# Patient Record
(demographics unavailable — no encounter records)

---

## 2024-10-30 NOTE — ADDENDUM
[FreeTextEntry1] : I, Pino Branch, documented this note as a scribe on behalf of Bubba Don MD on 10/30/2024.

## 2024-10-30 NOTE — DISCUSSION/SUMMARY
[de-identified] : We discussed further treatment options. She continues to have some back pain as well as neck issues. She is managing this very well with medications. We reviewed her imaging, and she declined obtaining MRI at this time. She is renovating her house currently and is living with her daughter. She will follow up in 3-6 months' time, at which time we will follow up with neck X-rays, sooner if any change or worsening of her symptoms.

## 2024-10-30 NOTE — HISTORY OF PRESENT ILLNESS
[de-identified] : Ms. Ohara presents to the office s/p L4-S1 fusion on 9/27/18.  No new complaints.  Symptoms are the same.   She has low back pain since she is packing up her house.  Requesting diclofenac gel, gabapentin, and cyclobenzaprine refill.

## 2024-10-30 NOTE — PHYSICAL EXAM
[Antalgic] : antalgic [Ataxic] : not ataxic [de-identified] : Examination of the cervical spine reveals no midline or paraspinal tenderness to palpation. No cervical lymphadenopathy. Decreased range of motion with respect to flexion, extension, rotation, and lateral bending. Negative Spurlings. Negative Lhermitte's. Full range of motion bilateral shoulders without evidence of impingement. No instability of bilateral upper extremities.  Cranial nerves II through XII grossly intact. Intact sensation bilateral upper extremities. 5/5 deltoids biceps triceps wrist extensors wrist flexors finger flexors and hand intrinsics. 1+ biceps triceps and brachioradialis reflexes. Negative Ramirez's. 2+ radial pulse. Negative Tinel's over the cubital and carpal tunnel. No skin lesions on the right and left upper extremities.\par  \par  Examination of the lumbar spine reveals no midline tenderness palpation, step-offs, or skin lesions. healed lumbar incision. Decreased range of motion with respect to flexion, extension, lateral bending, and rotation. No tenderness to palpation of the sciatic notch. No tenderness palpation of the bilateral greater trochanters. No pain with passive internal/external rotation of the hips. No instability of bilateral lower extremities.  Negative NEIL. Negative straight leg raise bilaterally. No bowstring. Negative femoral stretch. 5 out of 5 iliopsoas, hip abductors, hips adductors, quadriceps, hamstrings, gastrocsoleus, tibialis anterior, extensor hallucis longus, peroneals. Grossly intact sensation to light touch bilateral lower extremities. 1+ patellar and Achilles reflexes. Downgoing Babinski. No clonus. Intact proprioception. Palpable pulses. No skin lesion and no edema on the right and left lower extremities.\par  \par  Examination of the cervical spine reveals no midline or paraspinal tenderness to palpation. No cervical lymphadenopathy. Decreased range of motion with respect to flexion, extension, rotation, and lateral bending. Negative Spurlings. Negative Lhermitte's. Full range of motion bilateral shoulders without evidence of impingement. No instability of bilateral upper extremities.  Cranial nerves II through XII grossly intact. Intact sensation bilateral upper extremities. 5/5 deltoids biceps triceps wrist extensors wrist flexors finger flexors and hand intrinsics. 1+ biceps triceps and brachioradialis reflexes. Negative Ramirez's. 2+ radial pulse. Negative Tinel's over the cubital and carpal tunnel. No skin lesions on the right and left upper extremities. [de-identified] : AP lateral lumbar x-rays demonstrates instrumented L4-S1 fusion with improved posterior lateral arthrodesis. There may be some trace haloing of the S1 screws. No gross change from prior x-rays.\par  \par  AP lateral cervical x-rays reveals multilevel spondylosis\par  \par  Cervical spine MRI reveals multilevel foraminal stenosis.  No high-grade central stenosis.  No spinal cord compression or spinal cord signal abnormality.\par  \par  Review of her lumbar spine reveals adequate decompression at the surgical levels.  She has adjacent segment degeneration with some increased right greater than left foraminal stenosis

## 2024-10-30 NOTE — END OF VISIT
[FreeTextEntry3] : All medical record entries made by the Scribe were at my, Bubba Don MD, direction and personally dictated by me on 10/30/2024. I have reviewed the chart and agree that the record accurately reflects my personal performance of the history, physical exam, assessment and plan. I have also personally directed, reviewed, and agreed with the chart. [Time Spent: ___ minutes] : I have spent [unfilled] minutes of time on the encounter which excludes teaching and separately reported services.

## 2025-01-15 NOTE — DISCUSSION/SUMMARY
[FreeTextEntry1] : 65 -year-old woman with a history as listed above who is here for a followup visit  Galina is doing well. She denies any anginal symptoms. Clinically she is euvolemic on exam. Her MACDONALD is stable. She is taking lasix PRN  Her EKG is unchanged from previous. She has a baseline LBBB.  She had a nuclear stress that should an apical infarct but I think that this defect is most likely due to breast attenuation. Even so it is a small territory and will be medically managed unless her symptoms worsen.    Her palpitations are improved on Toprol 100mg q12. Continue aspirin 81 mg daily.  Her EKG is unchanged from previous. She has a longstanding LBBB.     She had extremely elevated cholesterol levels. This is most likely secondary to a familial hypercholesterolemia. She did not tolerate many statins. The Crestor wasn't approved by her insurance. She will continue with repatha. She is tolerating lovastatin 10mg QOD. If her lipids are still uncontrolled will try nexeltol.   Her last CT of chest in 7/2023 showed a stable ascending aortic size (4.9cm). She needs a repeat CT prior to her next visit. .    Smoking cessation counseling was performed for >3 mins.     She will always require antibiotic prophylaxis prior to dental surgery.   She will followup with me in 6  months or sooner if necessary. She will follow up with you for all of her other medical needs. [EKG obtained to assist in diagnosis and management of assessed problem(s)] : EKG obtained to assist in diagnosis and management of assessed problem(s)

## 2025-01-15 NOTE — PHYSICAL EXAM
[General Appearance - Well Developed] : well developed [General Appearance - In No Acute Distress] : no acute distress [Normal Conjunctiva] : the conjunctiva exhibited no abnormalities [No Oral Pallor] : no oral pallor [Respiration, Rhythm And Depth] : normal respiratory rhythm and effort [Auscultation Breath Sounds / Voice Sounds] : lungs were clear to auscultation bilaterally [Bowel Sounds] : normal bowel sounds [Abdomen Tenderness] : non-tender [Abnormal Walk] : normal gait [Cyanosis, Localized] : no localized cyanosis [] : no rash [Oriented To Time, Place, And Person] : oriented to person, place, and time [Not Palpable] : not palpable [No Precordial Heave] : no precordial heave was noted [Normal Rate] : normal [Rhythm Regular] : regular [Normal S1] : normal S1 [Normal S2] : normal S2 [No Gallop] : no gallop heard [II] : a grade 2 [III] : a grade 3 [Carotids] : the murmur was transmitted to the carotid arteries [1+] : left 1+ [No Abnormalities] : the abdominal aorta was not enlarged and no bruit was heard [___ +] : bilateral [unfilled]U+ pretibial pitting edema [FreeTextEntry1] : well healed sternotomy scar [Apical Thrill] : no thrill palpable at the apex [Click] : no click [Pericardial Rub] : no pericardial rub

## 2025-01-15 NOTE — HISTORY OF PRESENT ILLNESS
[FreeTextEntry1] : 64 year-old woman with a history of a bicuspid aortic valve with severe aortic stenosis, ascending aortic aneurysm,  hyperlipidemia, a recent GI bleed, nonobstructive coronary disease now  s/p AVR(B).  She is now here for a followup visit. Since her last visit, she is now living [place] and walking more. 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 She is limited by her back and neuropathic pain.  She notes mild MACDONALD with stairs.  She denies any chest pain. She   denies any near syncope, syncope, stroke like symptoms. She has been complaining diaphoresis, arthralgias.   She is smoking still about 1/4 pack a day.  She is compliant with her medications.

## 2025-01-15 NOTE — CARDIOLOGY SUMMARY
[de-identified] :  LBBB [de-identified] : 6/2020 small moderate defect in apical and distal anterior wall that is fixed. EF 58 8/2023 small-sized, moderate to severe defect(s) in the apex, apical anterior, apical septal and apical inferior walls that are fixed suggestive of an infarct. [de-identified] : 6/2020 AVR(b), mild ao dilation. normal lv function EF 65% 7/2023 A bioprosthetic valve replacement present in the aortic position. Well seated aortic valve prosthesis with normal function. Trace intravalvular regurgitation. normal LV function.  5. The proximal ascending aorta is moderately dilated at 4.4cm. 7/2024 normal LV function. avr(b). Ao 4.8 [de-identified] : 7/2023 CTA aorta stable ascending aorta 4.9cm.  [de-identified] : 11/2013 Elías)

## 2025-02-07 NOTE — PHYSICAL EXAM
[de-identified] : Constitutional:  65 year old female, alert and oriented, cooperative, in no acute distress.  HEENT  NC/AT.  Appearance: symmetric  Neck/Back Straight without deformity or instability.  Good ROM.  Chest/Respiratory  Respiratory effort: no intercostal retractions or use of accessory muscles. Nonlabored Breathing  Mental Status:  Judgment, insight: intact Orientation: oriented to time, place, and person  Neurological: Sensory and Motor are grossly intact throughout  Left Hip Exam: Inspection/Appearance:      Incision well healed, no erythema or drainage  Tenderness:                  NEIL Test: Negative                 FADIR Test: Negative  Range of Motion:                 Extension - 0  	Flexion - 100 	IR - 20  	ER - 30  	Abd - 40  	Add - 30   Stability: Normal without instability  Neurologic Exam     Motor intact including 5/5 Extensor Hallucis Longus, 5/5 Flexor Hallucis Longus, 5/5 Tibialis Anterior and 5/5 Gastrocnemius     Sensation Intact to Light Touch including Saphenous, Sural, Superficial Peroneal, Deep Peroneal, Tibial nerve distributions  Vascular Exam     Foot is warm and well perfused with 2+ Dorsalis Pedis Pulse [de-identified] : XRay:  XRays of the Pelvis (1 View) and Left Hip (2 Views) taken in the office today and reviewed with the patient. XRays demonstrate a Left Hip Intramedullary Nail in good position and alignment.. (my personal interpretation) Components: [place] Gamma Nail

## 2025-02-07 NOTE — PHYSICAL EXAM
[de-identified] : Constitutional:  65 year old female, alert and oriented, cooperative, in no acute distress.  HEENT  NC/AT.  Appearance: symmetric  Neck/Back Straight without deformity or instability.  Good ROM.  Chest/Respiratory  Respiratory effort: no intercostal retractions or use of accessory muscles. Nonlabored Breathing  Mental Status:  Judgment, insight: intact Orientation: oriented to time, place, and person  Neurological: Sensory and Motor are grossly intact throughout  Left Hip Exam: Inspection/Appearance:      Incision well healed, no erythema or drainage  Tenderness:                  NEIL Test: Negative                 FADIR Test: Negative  Range of Motion:                 Extension - 0  	Flexion - 100 	IR - 20  	ER - 30  	Abd - 40  	Add - 30   Stability: Normal without instability  Neurologic Exam     Motor intact including 5/5 Extensor Hallucis Longus, 5/5 Flexor Hallucis Longus, 5/5 Tibialis Anterior and 5/5 Gastrocnemius     Sensation Intact to Light Touch including Saphenous, Sural, Superficial Peroneal, Deep Peroneal, Tibial nerve distributions  Vascular Exam     Foot is warm and well perfused with 2+ Dorsalis Pedis Pulse [de-identified] : XRay:  XRays of the Pelvis (1 View) and Left Hip (2 Views) taken in the office today and reviewed with the patient. XRays demonstrate a Left Hip Intramedullary Nail in good position and alignment.. (my personal interpretation) Components: [place] Gamma Nail

## 2025-02-07 NOTE — DISCUSSION/SUMMARY
[de-identified] : Galina Ohara is a 65-year-old female who presents to the office for follow-up of her left hip IMN.  X-rays showed left hip IMN in good position and alignment.  Examination showed good left hip range of motion. Discussed with the patient the examination and imaging findings. Discussed the management of her left hip IMN at this time, including physical therapy and DVT prophylaxis. Patient was given a referral to physical therapy. Patient will continued to take Eliquis twice daily for a total of 6 weeks for DVT prophylaxis. Dressing was removed. Discussed that patient may shower, but should not soak the wound. Patient will follow up in 4 weeks for reevaluation and management. Patient understanding and in agreement with the plan. All questions answered.     Plan: -Physical Therapy -DVT Prophylaxis: Eliquis twice daily for a total of 6 weeks -Patient may shower, but should not soak the wound -Follow up in 4 weeks for reevaluation and management

## 2025-02-07 NOTE — HISTORY OF PRESENT ILLNESS
[de-identified] : Galina Ohara is a 65-year-old female presents to the office for follow-up of her left hip IMN.  Patient underwent left hip IMN for left intertrochanteric hip fracture.  She is currently doing well overall.  Patient is at home.  She is currently in physical therapy.  She is taking her Eliquis.

## 2025-02-07 NOTE — DISCUSSION/SUMMARY
[de-identified] : Galina Ohara is a 65-year-old female who presents to the office for follow-up of her left hip IMN.  X-rays showed left hip IMN in good position and alignment.  Examination showed good left hip range of motion. Discussed with the patient the examination and imaging findings. Discussed the management of her left hip IMN at this time, including physical therapy and DVT prophylaxis. Patient was given a referral to physical therapy. Patient will continued to take Eliquis twice daily for a total of 6 weeks for DVT prophylaxis. Dressing was removed. Discussed that patient may shower, but should not soak the wound. Patient will follow up in 4 weeks for reevaluation and management. Patient understanding and in agreement with the plan. All questions answered.     Plan: -Physical Therapy -DVT Prophylaxis: Eliquis twice daily for a total of 6 weeks -Patient may shower, but should not soak the wound -Follow up in 4 weeks for reevaluation and management

## 2025-02-07 NOTE — HISTORY OF PRESENT ILLNESS
[de-identified] : Galina Ohara is a 65-year-old female presents to the office for follow-up of her left hip IMN.  Patient underwent left hip IMN for left intertrochanteric hip fracture.  She is currently doing well overall.  Patient is at home.  She is currently in physical therapy.  She is taking her Eliquis.

## 2025-03-07 NOTE — DISCUSSION/SUMMARY
[de-identified] : Galina Ohara is a 65-year-old female who presents to the office for follow-up of her left hip IMN.  X-rays showed left hip IMN in good position and alignment.  Examination showed good left hip range of motion. Discussed with the patient the examination and imaging findings. Discussed the management of her left hip IMN at this time, including physical therapy. Patient will continue physical therapy. Patient has completed DVT prophylaxis. Patient will follow up in 6 weeks for reevaluation and management. Patient understanding and in agreement with the plan. All questions answered.     Plan: -Physical Therapy -DVT Prophylaxis: Completed -Follow up in 6 weeks for reevaluation and management

## 2025-03-07 NOTE — HISTORY OF PRESENT ILLNESS
[de-identified] : 3/7/2025  Galina Ohara presents to the office for follow-up of her left hip IMN.  She is currently doing well overall.  Patient is walking with a cane.  She is currently in physical therapy.  She can have some knee and ankle pain.  2/6/2025 Galina Ohara is a 65-year-old female presents to the office for follow-up of her left hip IMN.  Patient underwent left hip IMN for left intertrochanteric hip fracture.  She is currently doing well overall.  Patient is at home.  She is currently in physical therapy.  She is taking her Eliquis.

## 2025-03-07 NOTE — HISTORY OF PRESENT ILLNESS
[de-identified] : 3/7/2025  Galina Ohara presents to the office for follow-up of her left hip IMN.  She is currently doing well overall.  Patient is walking with a cane.  She is currently in physical therapy.  She can have some knee and ankle pain.  2/6/2025 Galina Ohara is a 65-year-old female presents to the office for follow-up of her left hip IMN.  Patient underwent left hip IMN for left intertrochanteric hip fracture.  She is currently doing well overall.  Patient is at home.  She is currently in physical therapy.  She is taking her Eliquis.

## 2025-03-07 NOTE — PHYSICAL EXAM
[de-identified] : Constitutional:  65 year old female, alert and oriented, cooperative, in no acute distress.  HEENT  NC/AT.  Appearance: symmetric  Neck/Back Straight without deformity or instability.  Good ROM.  Chest/Respiratory  Respiratory effort: no intercostal retractions or use of accessory muscles. Nonlabored Breathing  Mental Status:  Judgment, insight: intact Orientation: oriented to time, place, and person  Neurological: Sensory and Motor are grossly intact throughout  Left Hip Exam: Inspection/Appearance:      Incision well healed, no erythema or drainage  Tenderness:                  NEIL Test: Negative                 FADIR Test: Negative  Range of Motion:                 Extension - 0  	Flexion - 100 	IR - 20  	ER - 30  	Abd - 40  	Add - 30   Stability: Normal without instability  Neurologic Exam     Motor intact including 5/5 Extensor Hallucis Longus, 5/5 Flexor Hallucis Longus, 5/5 Tibialis Anterior and 5/5 Gastrocnemius     Sensation Intact to Light Touch including Saphenous, Sural, Superficial Peroneal, Deep Peroneal, Tibial nerve distributions  Vascular Exam     Foot is warm and well perfused with 2+ Dorsalis Pedis Pulse [de-identified] : XRay:  XRays of the Pelvis (1 View) and Left Hip (2 Views) taken today in the office. XRays demonstrate a Left Hip Intramedullary Nail in good position and alignment.. (my personal interpretation) Components: [place] Gamma Nail

## 2025-03-07 NOTE — PHYSICAL EXAM
[de-identified] : Constitutional:  65 year old female, alert and oriented, cooperative, in no acute distress.  HEENT  NC/AT.  Appearance: symmetric  Neck/Back Straight without deformity or instability.  Good ROM.  Chest/Respiratory  Respiratory effort: no intercostal retractions or use of accessory muscles. Nonlabored Breathing  Mental Status:  Judgment, insight: intact Orientation: oriented to time, place, and person  Neurological: Sensory and Motor are grossly intact throughout  Left Hip Exam: Inspection/Appearance:      Incision well healed, no erythema or drainage  Tenderness:                  NEIL Test: Negative                 FADIR Test: Negative  Range of Motion:                 Extension - 0  	Flexion - 100 	IR - 20  	ER - 30  	Abd - 40  	Add - 30   Stability: Normal without instability  Neurologic Exam     Motor intact including 5/5 Extensor Hallucis Longus, 5/5 Flexor Hallucis Longus, 5/5 Tibialis Anterior and 5/5 Gastrocnemius     Sensation Intact to Light Touch including Saphenous, Sural, Superficial Peroneal, Deep Peroneal, Tibial nerve distributions  Vascular Exam     Foot is warm and well perfused with 2+ Dorsalis Pedis Pulse [de-identified] : XRay:  XRays of the Pelvis (1 View) and Left Hip (2 Views) taken today in the office. XRays demonstrate a Left Hip Intramedullary Nail in good position and alignment.. (my personal interpretation) Components: [place] Gamma Nail

## 2025-03-07 NOTE — DISCUSSION/SUMMARY
[de-identified] : Galina Ohara is a 65-year-old female who presents to the office for follow-up of her left hip IMN.  X-rays showed left hip IMN in good position and alignment.  Examination showed good left hip range of motion. Discussed with the patient the examination and imaging findings. Discussed the management of her left hip IMN at this time, including physical therapy. Patient will continue physical therapy. Patient has completed DVT prophylaxis. Patient will follow up in 6 weeks for reevaluation and management. Patient understanding and in agreement with the plan. All questions answered.     Plan: -Physical Therapy -DVT Prophylaxis: Completed -Follow up in 6 weeks for reevaluation and management

## 2025-04-14 NOTE — ASSESSMENT
[FreeTextEntry1] :  Ms. RÍOS is a 65 year old female current smoker ( 1PPD/ 40 yrs)  with  past medical history of  a bicuspid aortic PD/ valve with severe aortic stenosis, ascending aortic aneurysm, hyperlipidemia, a recent GI bleed, nonobstructive coronary disease , s/p AVR(B) in 2013 with Dr. Collins Ernst , LBBB, HTN, GIB,TIA (1961), Right AL (2017 Dr. Ferrari), L2-S1 laminectomy/fusion (2018 Dr. Don) and Thoracic aortic aneurysm for past 10 years.   Today she presents and reports that she has occasional palpitations and dizziness. She had a mechanical fall in Jan 2025 and was admitted to [facility] and had left hip intramedullary nailing with Dr. Stone on 1/22/2025.  She has pain to LT hip pain down to the leg with numbness. She is getting PT for the LT leg. Denies any chest pain, shortness of breath or pedal edema.   1/22/25 TTE revealed 1. The left ventricular cavity is normal in size. Left ventricular wall thickness is normal. Septal motion is abnormal consistent with previous cardiac surgery. Left ventricular systolic function is mildly decreased with an ejection fraction visually estimated at 60 to 65%. There are regional wall motion abnormalities present. Hypokinesis of the apex. Overall left ventricular systolic function is preserved.  2. Normal right ventricular cavity size and normal right ventricular systolic function.  3. Structurally normal mitral valve with normal leaflet excursion. There is calcification of the mitral valve annulus. There is trace mitral regurgitation.  4. A bioprosthetic valve replacement is present in the aortic position. The prosthetic valve is well seated with normal function. The peak transaortic velocity is 1.81 m/s, peak transaortic gradient is 13.2 mmHg and mean transaortic gradient is 6.3 mmHg with an LVOT/aortic valve VTI ratio of 0.75. There is no intravalvular regurgitation.  5. No prior echocardiogram is available for comparison.  3/19/25 CTA Chest revealed 52 mm dilation of the ascending thoracic aorta.    The patient's medical records and diagnostic images were reviewed at the time of this office visit, and the following recommendation was made. Patient does not meet criteria for surgical intervention at the time and will be entered into the aortic registry surveillance program.    Plan   1. Follow up in 3 months  with TAVR CT  2. Continue medication regimen. 3. Follow up with cardiologist and PCP. 4. BP control- I have recommended the patient to monitor his blood pressure closely. I have also advised the patient to take daily blood pressures at home and adhere to medication regimen. 5. Discussed signs and symptoms that warrant emergency medical attention

## 2025-04-14 NOTE — DATA REVIEWED
[FreeTextEntry1] : 1/22/25 TTE revealed 1. The left ventricular cavity is normal in size. Left ventricular wall thickness is normal. Septal motion is abnormal consistent with previous cardiac surgery. Left ventricular systolic function is mildly decreased with an ejection fraction visually estimated at 60 to 65%. There are regional wall motion abnormalities present. Hypokinesis of the apex. Overall left ventricular systolic function is preserved.  2. Normal right ventricular cavity size and normal right ventricular systolic function.  3. Structurally normal mitral valve with normal leaflet excursion. There is calcification of the mitral valve annulus. There is trace mitral regurgitation.  4. A bioprosthetic valve replacement is present in the aortic position. The prosthetic valve is well seated with normal function. The peak transaortic velocity is 1.81 m/s, peak transaortic gradient is 13.2 mmHg and mean transaortic gradient is 6.3 mmHg with an LVOT/aortic valve VTI ratio of 0.75. There is no intravalvular regurgitation.  5. No prior echocardiogram is available for comparison.  3/19/25 CTA Chest revealed 52 mm dilation of the ascending thoracic aorta.

## 2025-04-14 NOTE — HISTORY OF PRESENT ILLNESS
[FreeTextEntry1] :  Ms. RÍOS is a 65 year old female current smoker ( 1PPD/ 40 yrs)  with  past medical history of  a bicuspid aortic PD/ valve with severe aortic stenosis, ascending aortic aneurysm, hyperlipidemia, a recent GI bleed, nonobstructive coronary disease , s/p AVR(B) in 2013 with Dr. Collins Ernst , LBBB, HTN, GIB,TIA (1961), Right AL (2017 Dr. Ferrari), L2-S1 laminectomy/fusion (2018 Dr. Don) and Thoracic aortic aneurysm for past 10 years.   Today she presents and reports that she has occasional palpitations and dizziness. She had a mechanical fall in Jan 2025 and was admitted to [facility] and had left hip intramedullary nailing with Dr. Stone on 1/22/2025.  She has pain to LT hip pain down to the leg with numbness. She is getting PT for the LT leg. Denies any chest pain, shortness of breath or pedal edema.    NYHA class I

## 2025-04-14 NOTE — HISTORY OF PRESENT ILLNESS
[FreeTextEntry1] :  Ms. RÍOS is a 65 year old female current smoker ( 1PPD/ 40 yrs)  with  past medical history of  a bicuspid aortic PD/ valve with severe aortic stenosis, ascending aortic aneurysm, hyperlipidemia, a recent GI bleed, nonobstructive coronary disease , s/p AVR(B) in 2013 with Dr. Collins Ernst , LBBB, HTN, GIB,TIA (1961), Right AL (2017 Dr. Ferrari), L2-S1 laminectomy/fusion (2018 Dr. Don) and Thoracic aortic aneurysm for past 10 years.   Today she presents and reports that she has occasional palpitations and dizziness. She had a mechanical fall in Jan 2025 and was admitted to [facility] and had left hip intramedullary nailing with Dr. Stone on 1/22/2025.  She has pain to LT hip pain down to the leg with numbness. She is getting PT for the LT leg. Denies any chest pain, shortness of breath or pedal edema.    NYHA class I

## 2025-04-14 NOTE — CONSULT LETTER
[Dear  ___] : Dear  [unfilled], [Courtesy Letter:] : I had the pleasure of seeing your patient, [unfilled], in my office today. [Please see my note below.] : Please see my note below. [Consult Closing:] : Thank you very much for allowing me to participate in the care of this patient.  If you have any questions, please do not hesitate to contact me. [Sincerely,] : Sincerely, [FreeTextEntry2] : Dr. Isidro Ortiz [FreeTextEntry3] : Collins Ernst MD Attending Surgeon  [facility]   Cardiovascular & Thoracic Surgery [facility]

## 2025-04-14 NOTE — ASSESSMENT
[FreeTextEntry1] :  Ms. RÍOS is a 65 year old female current smoker ( 1PPD/ 40 yrs)  with  past medical history of  a bicuspid aortic PD/ valve with severe aortic stenosis, ascending aortic aneurysm, hyperlipidemia, a recent GI bleed, nonobstructive coronary disease , s/p AVR(B) in 2013 with Dr. Collins Ernst , LBBB, HTN, GIB,TIA (1961), Right AL (2017 Dr. Ferrari), L2-S1 laminectomy/fusion (2018 Dr. Don) and Thoracic aortic aneurysm for past 10 years.   Today she presents and reports that she has occasional palpitations and dizziness. She had a mechanical fall in Jan 2025 and was admitted to [facility] and had left hip intramedullary nailing with Dr. Stone on 1/22/2025.  She has pain to LT hip pain down to the leg with numbness. She is getting PT for the LT leg. Denies any chest pain, shortness of breath or pedal edema.   1/22/25 TTE revealed 1. The left ventricular cavity is normal in size. Left ventricular wall thickness is normal. Septal motion is abnormal consistent with previous cardiac surgery. Left ventricular systolic function is mildly decreased with an ejection fraction visually estimated at 60 to 65%. There are regional wall motion abnormalities present. Hypokinesis of the apex. Overall left ventricular systolic function is preserved.  2. Normal right ventricular cavity size and normal right ventricular systolic function.  3. Structurally normal mitral valve with normal leaflet excursion. There is calcification of the mitral valve annulus. There is trace mitral regurgitation.  4. A bioprosthetic valve replacement is present in the aortic position. The prosthetic valve is well seated with normal function. The peak transaortic velocity is 1.81 m/s, peak transaortic gradient is 13.2 mmHg and mean transaortic gradient is 6.3 mmHg with an LVOT/aortic valve VTI ratio of 0.75. There is no intravalvular regurgitation.  5. No prior echocardiogram is available for comparison.  3/19/25 CTA Chest revealed 52 mm dilation of the ascending thoracic aorta.    The patient's medical records and diagnostic images were reviewed at the time of this office visit, and the following recommendation was made. Patient does not meet criteria for surgical intervention at the time and will be entered into the aortic registry surveillance program.    Plan   1. Follow up in 3 months  with TAVR CT  2. Continue medication regimen. 3. Follow up with cardiologist and PCP. 4. BP control- I have recommended the patient to monitor his blood pressure closely. I have also advised the patient to take daily blood pressures at home and adhere to medication regimen. 5. Discussed signs and symptoms that warrant emergency medical attention

## 2025-04-14 NOTE — CONSULT LETTER
[Dear  ___] : Dear  [unfilled], [Courtesy Letter:] : I had the pleasure of seeing your patient, [unfilled], in my office today. [Please see my note below.] : Please see my note below. [Consult Closing:] : Thank you very much for allowing me to participate in the care of this patient.  If you have any questions, please do not hesitate to contact me. [Sincerely,] : Sincerely, [FreeTextEntry2] : Dr. Isidro Ortiz [FreeTextEntry3] : Collins Ernst MD Attending Surgeon  [facility]   Cardiovascular & Thoracic Surgery [facility]

## 2025-04-14 NOTE — PHYSICAL EXAM
[General Appearance - Alert] : alert [General Appearance - In No Acute Distress] : in no acute distress [General Appearance - Well Nourished] : well nourished [General Appearance - Well Developed] : well developed [Sclera] : the sclera and conjunctiva were normal [PERRL With Normal Accommodation] : pupils were equal in size, round, and reactive to light [Outer Ear] : the ears and nose were normal in appearance [Hearing Threshold Finger Rub Not [place]] : hearing was normal [Both Tympanic Membranes Were Examined] : both tympanic membranes were normal [Neck Appearance] : the appearance of the neck was normal [] : no respiratory distress [Apical Impulse] : the apical impulse was normal [Heart Rate And Rhythm] : heart rate was normal and rhythm regular [Heart Sounds] : normal S1 and S2 [Murmurs] : no murmurs [Examination Of The Chest] : the chest was normal in appearance [2+] : left 2+ [Breast Appearance] : normal in appearance [Bowel Sounds] : normal bowel sounds [Abdomen Soft] : soft [FreeTextEntry1] : Deferred  [No CVA Tenderness] : no ~M costovertebral angle tenderness [Abnormal Walk] : normal gait [Involuntary Movements] : no involuntary movements were seen [Skin Color & Pigmentation] : normal skin color and pigmentation [Skin Turgor] : normal skin turgor [No Focal Deficits] : no focal deficits [Oriented To Time, Place, And Person] : oriented to person, place, and time [Impaired Insight] : insight and judgment were intact [Affect] : the affect was normal [Mood] : the mood was normal [Memory Recent] : recent memory was not impaired [Memory Remote] : remote memory was not impaired

## 2025-04-14 NOTE — CONSULT LETTER
[Dear  ___] : Dear  [unfilled], [Courtesy Letter:] : I had the pleasure of seeing your patient, [unfilled], in my office today. [Please see my note below.] : Please see my note below. [Consult Closing:] : Thank you very much for allowing me to participate in the care of this patient.  If you have any questions, please do not hesitate to contact me. [Sincerely,] : Sincerely, [FreeTextEntry2] : Dr. Isidro Ortiz [FreeTextEntry3] : Collins Ernst MD Attending Surgeon  [facility]   Cardiovascular & Thoracic Surgery [facility]

## 2025-04-14 NOTE — HISTORY OF PRESENT ILLNESS
[FreeTextEntry1] :  Ms. RÍOS is a 65 year old female current smoker ( 1PPD/ 40 yrs)  with  past medical history of  a bicuspid aortic PD/ valve with severe aortic stenosis, ascending aortic aneurysm, hyperlipidemia, a recent GI bleed, nonobstructive coronary disease , s/p AVR(B) in 2013 with Dr. Collins Ernst , LBBB, HTN, GIB,TIA (1961), Right AL (2017 Dr. Ferrari), L2-S1 laminectomy/fusion (2018 Dr. Don) and Thoracic aortic aneurysm for past 10 years.   Today she presents and reports that she has occasional palpitations and dizziness. She had a mechanical fall in Jan 2025 and was admitted to [facility] and had left hip intramedullary nailing with Dr. Stone on 1/22/2025.  She has pain to LT hip pain down to the leg with numbness. She is getting PT for the LT leg. Denies any chest pain, shortness of breath or pedal edema.    NYHA class I

## 2025-04-14 NOTE — END OF VISIT
[FreeTextEntry3] :  I, JAN Martinez , personally performed the evaluation and management (E/M) services for this new patient. That E/M includes conducting the initial examination, assessing all conditions, and establishing the plan of care. Today, DELAI HACKETT  was here to observe my evaluation and management services for this patient.

## 2025-04-14 NOTE — PHYSICAL EXAM
[General Appearance - Alert] : alert [General Appearance - In No Acute Distress] : in no acute distress [General Appearance - Well Nourished] : well nourished [General Appearance - Well Developed] : well developed [Sclera] : the sclera and conjunctiva were normal [PERRL With Normal Accommodation] : pupils were equal in size, round, and reactive to light [Outer Ear] : the ears and nose were normal in appearance [Hearing Threshold Finger Rub Not [place]] : hearing was normal [Both Tympanic Membranes Were Examined] : both tympanic membranes were normal [Neck Appearance] : the appearance of the neck was normal [] : no respiratory distress [Apical Impulse] : the apical impulse was normal [Heart Rate And Rhythm] : heart rate was normal and rhythm regular [Heart Sounds] : normal S1 and S2 [Murmurs] : no murmurs [Examination Of The Chest] : the chest was normal in appearance [2+] : left 2+ [Breast Appearance] : normal in appearance [Bowel Sounds] : normal bowel sounds [Abdomen Soft] : soft [FreeTextEntry1] : Deferred  [No CVA Tenderness] : no ~M costovertebral angle tenderness [Abnormal Walk] : normal gait [Involuntary Movements] : no involuntary movements were seen [Skin Color & Pigmentation] : normal skin color and pigmentation [Skin Turgor] : normal skin turgor [No Focal Deficits] : no focal deficits [Oriented To Time, Place, And Person] : oriented to person, place, and time [Impaired Insight] : insight and judgment were intact [Affect] : the affect was normal [Mood] : the mood was normal [Memory Recent] : recent memory was not impaired [Memory Remote] : remote memory was not impaired

## 2025-04-14 NOTE — PHYSICAL EXAM
[General Appearance - Alert] : alert [General Appearance - In No Acute Distress] : in no acute distress [General Appearance - Well Nourished] : well nourished [General Appearance - Well Developed] : well developed [Sclera] : the sclera and conjunctiva were normal [PERRL With Normal Accommodation] : pupils were equal in size, round, and reactive to light [Outer Ear] : the ears and nose were normal in appearance [Hearing Threshold Finger Rub Not [place]] : hearing was normal [Both Tympanic Membranes Were Examined] : both tympanic membranes were normal [Neck Appearance] : the appearance of the neck was normal [] : no respiratory distress [Apical Impulse] : the apical impulse was normal [Heart Rate And Rhythm] : heart rate was normal and rhythm regular [Heart Sounds] : normal S1 and S2 [Murmurs] : no murmurs [Examination Of The Chest] : the chest was normal in appearance [2+] : left 2+ [Breast Appearance] : normal in appearance [Bowel Sounds] : normal bowel sounds [Abdomen Soft] : soft [FreeTextEntry1] : Deferred  [No CVA Tenderness] : no ~M costovertebral angle tenderness [Abnormal Walk] : normal gait [Involuntary Movements] : no involuntary movements were seen [Skin Color & Pigmentation] : normal skin color and pigmentation [Skin Turgor] : normal skin turgor [No Focal Deficits] : no focal deficits [Oriented To Time, Place, And Person] : oriented to person, place, and time [Impaired Insight] : insight and judgment were intact [Affect] : the affect was normal [Mood] : the mood was normal [Memory Recent] : recent memory was not impaired [Memory Remote] : remote memory was not impaired

## 2025-04-14 NOTE — END OF VISIT
[FreeTextEntry3] :  I, JAN Martinez , personally performed the evaluation and management (E/M) services for this new patient. That E/M includes conducting the initial examination, assessing all conditions, and establishing the plan of care. Today, DELIA HACKETT  was here to observe my evaluation and management services for this patient.

## 2025-04-14 NOTE — ASSESSMENT
[FreeTextEntry1] :  Ms. RÍOS is a 65 year old female current smoker ( 1PPD/ 40 yrs)  with  past medical history of  a bicuspid aortic PD/ valve with severe aortic stenosis, ascending aortic aneurysm, hyperlipidemia, a recent GI bleed, nonobstructive coronary disease , s/p AVR(B) in 2013 with Dr. Collins Ernst , LBBB, HTN, GIB,TIA (1961), Right AL (2017 Dr. Ferrari), L2-S1 laminectomy/fusion (2018 Dr. Don) and Thoracic aortic aneurysm for past 10 years.   Today she presents and reports that she has occasional palpitations and dizziness. She had a mechanical fall in Jan 2025 and was admitted to [facility] and had left hip intramedullary nailing with Dr. Stone on 1/22/2025.  She has pain to LT hip pain down to the leg with numbness. She is getting PT for the LT leg. Denies any chest pain, shortness of breath or pedal edema.   1/22/25 TTE revealed 1. The left ventricular cavity is normal in size. Left ventricular wall thickness is normal. Septal motion is abnormal consistent with previous cardiac surgery. Left ventricular systolic function is mildly decreased with an ejection fraction visually estimated at 60 to 65%. There are regional wall motion abnormalities present. Hypokinesis of the apex. Overall left ventricular systolic function is preserved.  2. Normal right ventricular cavity size and normal right ventricular systolic function.  3. Structurally normal mitral valve with normal leaflet excursion. There is calcification of the mitral valve annulus. There is trace mitral regurgitation.  4. A bioprosthetic valve replacement is present in the aortic position. The prosthetic valve is well seated with normal function. The peak transaortic velocity is 1.81 m/s, peak transaortic gradient is 13.2 mmHg and mean transaortic gradient is 6.3 mmHg with an LVOT/aortic valve VTI ratio of 0.75. There is no intravalvular regurgitation.  5. No prior echocardiogram is available for comparison.  3/19/25 CTA Chest revealed 52 mm dilation of the ascending thoracic aorta.    The patient's medical records and diagnostic images were reviewed at the time of this office visit, and the following recommendation was made. Patient does not meet criteria for surgical intervention at the time and will be entered into the aortic registry surveillance program.    Plan   1. Follow up in 3 months  with TAVR CT  2. Continue medication regimen. 3. Follow up with cardiologist and PCP. 4. BP control- I have recommended the patient to monitor his blood pressure closely. I have also advised the patient to take daily blood pressures at home and adhere to medication regimen. 5. Discussed signs and symptoms that warrant emergency medical attention

## 2025-04-30 NOTE — END OF VISIT
[FreeTextEntry3] : All medical record entries made by the Scribe were at my, Bubba Don MD, direction and personally dictated by me on 04/30/2025. I have reviewed the chart and agree that the record accurately reflects my personal performance of the history, physical exam, assessment and plan. I have also personally directed, reviewed, and agreed with the chart. [Time Spent: ___ minutes] : I have spent [unfilled] minutes of time on the encounter which excludes teaching and separately reported services.

## 2025-04-30 NOTE — ADDENDUM
[FreeTextEntry1] : I, Pino Branch, documented this note as a scribe on behalf of Bubba Don MD on 04/30/2025.

## 2025-04-30 NOTE — DISCUSSION/SUMMARY
[de-identified] : We discussed further treatment options. She does have some slightly increased back pain. She attributes this to a fall in January which required surgical fixation of her left hip. At this time, she will continue with physical therapy. Follow up in 3 months.

## 2025-04-30 NOTE — PHYSICAL EXAM
[Antalgic] : antalgic [Ataxic] : not ataxic [Cane] : ambulates with cane [de-identified] : Decreased range of motion of the lumbar spine.  She does have some limitations in hip flexion and knee extension due to recent hip surgery. [de-identified] : AP lateral lumbar x-rays demonstrates instrumented L4-S1 fusion with improved posterior lateral arthrodesis. There may be some trace haloing of the S1 screws. No gross change from prior x-rays.  AP lateral cervical x-rays reveals multilevel spondylosis  Cervical spine MRI reveals multilevel foraminal stenosis.  No high-grade central stenosis.  No spinal cord compression or spinal cord signal abnormality.  Review of her lumbar spine reveals adequate decompression at the surgical levels.  She has adjacent segment degeneration with some increased right greater than left foraminal stenosis

## 2025-04-30 NOTE — HISTORY OF PRESENT ILLNESS
[de-identified] : Ms. Ohara presents to the office s/p L4-S1 fusion on 9/27/18.  She fell in January and had left hip surgery.  She has been having low back pain.  Requesting diclofenac gel, gabapentin, and cyclobenzaprine refill.

## 2025-07-09 NOTE — HISTORY OF PRESENT ILLNESS
[FreeTextEntry1] : 65 year-old woman with a history of a bicuspid aortic valve with severe aortic stenosis, ascending aortic aneurysm,  hyperlipidemia, a recent GI bleed, nonobstructive coronary disease now  s/p AVR(B).  She is now here for a followup visit. 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  She had a mechanical fall in Feb and  needed surgery for a femur fracture. She is doing PT 2 times a week. She denies any chest pain. She   denies any near syncope, syncope, stroke like symptoms. She has been complaining diaphoresis, arthralgias.   She is smoking still about 1/4 pack a day.  She was noncompliant with her lovastatin and repatha. Now restarted.

## 2025-07-09 NOTE — CARDIOLOGY SUMMARY
[de-identified] :  LBBB [de-identified] : 6/2020 small moderate defect in apical and distal anterior wall that is fixed. EF 58 8/2023 small-sized, moderate to severe defect(s) in the apex, apical anterior, apical septal and apical inferior walls that are fixed suggestive of an infarct. 1/2025 The left ventricular cavity is normal in size. Left ventricular wall thickness is normal. Septal motion is abnormal consistent with previous cardiac surgery. Left ventricular systolic function is mildly decreased with an ejection fraction visually estimated at 60 to 65%. There are regional wall motion abnormalities present. Hypokinesis of the apex. Overall left ventricular systolic function is preserved. [de-identified] : 6/2020 AVR(b), mild ao dilation. normal lv function EF 65% 7/2023 A bioprosthetic valve replacement present in the aortic position. Well seated aortic valve prosthesis with normal function. Trace intravalvular regurgitation. normal LV function.  5. The proximal ascending aorta is moderately dilated at 4.4cm. 7/2024 normal LV function. avr(b). Ao 4.8 [de-identified] : 7/2023 CTA aorta stable ascending aorta 4.9cm.  2025 TAVR CT 4.9 ascending aorta  [de-identified] : 11/2013 Elías)

## 2025-07-09 NOTE — DISCUSSION/SUMMARY
[FreeTextEntry1] : 65 -year-old woman with a history as listed above who is here for a followup visit  Galina is doing well. She denies any anginal symptoms. Clinically she is euvolemic on exam. Her MACDONALD is stable. She is taking lasix PRN  Her EKG is unchanged from previous. She has a baseline LBBB.  She had a nuclear stress that should an apical infarct but I think that this defect is most likely due to breast attenuation. Even so it is a small territory and will be medically managed unless her symptoms worsen.    Her palpitations are improved on Toprol 100mg q12. Continue aspirin 81 mg daily.  Her EKG is unchanged from previous. She has a longstanding LBBB.     She had extremely elevated cholesterol levels. This is most likely secondary to a familial hypercholesterolemia. She did not tolerate many statins. The Crestor wasn't approved by her insurance. She has now resumed her meds. She will continue with repatha. She is tolerating lovastatin 10mg QOD. Will repeat her lipids next month. If her lipids are still uncontrolled will try nexeltol.   Her last CT of chest in 7/2025 showed a stable ascending aortic size (4.9cm). She will fu with CT sx.    Smoking cessation counseling was performed for >3 mins.     She will always require antibiotic prophylaxis prior to dental surgery.   She will followup with me in 6  months or sooner if necessary. She will follow up with you for all of her other medical needs. [EKG obtained to assist in diagnosis and management of assessed problem(s)] : EKG obtained to assist in diagnosis and management of assessed problem(s)

## 2025-07-18 NOTE — HISTORY OF PRESENT ILLNESS
[de-identified] : 7/18/2025  Galina Ohara presents to the office for follow-up of her left hip IM nail.  Patient continues to walk with a cane.  Her pain has progressed.  She has lateral hip pain and some knee pain.  She can have posterior hip pain.  No falls.  Patient does continue to smoke cigarettes.  No fevers or chills.  4/4/2025  Galina Ohara presents to the office for follow-up of her left hip IMN.  Patient is currently walking well with a cane.  She is currently in physical therapy.  She can have some continued knee and ankle pain.  She can have some posterior hip pain.  Patient has a history of a lumbar fusion.  3/7/2025  Galina Ohara presents to the office for follow-up of her left hip IMN.  She is currently doing well overall.  Patient is walking with a cane.  She is currently in physical therapy.  She can have some knee and ankle pain.  2/6/2025 Galina Ohara is a 65-year-old female presents to the office for follow-up of her left hip IMN.  Patient underwent left hip IMN for left intertrochanteric hip fracture.  She is currently doing well overall.  Patient is at home.  She is currently in physical therapy.  She is taking her Eliquis.

## 2025-07-18 NOTE — PHYSICAL EXAM
[de-identified] : Constitutional:  65 year old female, alert and oriented, cooperative, in no acute distress.  HEENT  NC/AT.  Appearance: symmetric  Neck/Back Straight without deformity or instability.  Good ROM.  Chest/Respiratory  Respiratory effort: no intercostal retractions or use of accessory muscles. Nonlabored Breathing  Mental Status:  Judgment, insight: intact Orientation: oriented to time, place, and person  Neurological: Sensory and Motor are grossly intact throughout  Left Hip Exam: Inspection/Appearance:      Incision well healed, no erythema or drainage  Tenderness:                  NEIL Test: Negative                 FADIR Test: Negative  Range of Motion:                 Extension - 0  	Flexion - 100 	IR - 20  	ER - 30  	Abd - 40  	Add - 30   Stability: Normal without instability  Neurologic Exam     Motor intact including 5/5 Extensor Hallucis Longus, 5/5 Flexor Hallucis Longus, 5/5 Tibialis Anterior and 5/5 Gastrocnemius     Sensation Intact to Light Touch including Saphenous, Sural, Superficial Peroneal, Deep Peroneal, Tibial nerve distributions  Vascular Exam     Foot is warm and well perfused with 2+ Dorsalis Pedis Pulse [de-identified] : XRay:  XRays of the Pelvis (1 View) and Left Hip (2 Views) taken today in the office. XRays demonstrate a Left Hip Intramedullary Nail. There has been some collapse of the fracture side and varus alignment. There is persistence of the fracture line. (my personal interpretation) Components: Mary Gamma Nail  XRay:  XRays of the Left Ankle (3 Views) taken on 4/4/2025. XRays demonstrate no obvious fracture or dislocation. There is no significant evidence of osteoarthritis or osteophyte formation. (my personal interpretation).  XRay:  XRays of the Left Knee (4 Views) taken on 4/4/2025. XRays demonstrate no obvious fracture or dislocation. There is no significant evidence of osteoarthritis or osteophyte formation. (my personal interpretation).

## 2025-07-18 NOTE — DISCUSSION/SUMMARY
[de-identified] : Galina Ohara is a 65-year-old female who presents to the office for follow-up of her left hip IMN.  X-rays showed left hip fracture collapse and varus progression. Examination showed good left hip range of motion. Discussed with the patient the examination and imaging findings.  Discussed with the patient that there is a possible nonunion of her fracture.  Discussed the operative and nonoperative management of her left hip at this time, including conversion total hip arthroplasty.  Discussed conversion total hip arthroplasty at length, including physical therapy, implants, recovery, surgical approach, and risks.  Discussed smoking cessation prior to any surgery.  Patient does not want surgery at this time.  She would like to consider surgery in the next few months.  Discussed risks of nonoperative management, including further fracture collapse and falls.  Discussed continued monitoring via x-rays.  Discussed the workup of her left hip at this time, including imaging and laboratory workup.  ESR and CRP were ordered.  CT scan of the left hip was ordered.  Patient will follow-up in 6 weeks for reevaluation management.  Patient understanding and in agreement with the plan.  All questions answered.  Plan: - Follow-up ESR and CRP - CT scan of the left hip - Follow-up in 6 weeks for reevaluation and management

## 2025-07-28 NOTE — ASSESSMENT
[FreeTextEntry1] : CT TAVR imaging reviewed and interpreted. Ascending aortic aneurysm is stable. Patient is stable and doing well.  Incidental finding of nonspecific soft tissue prominence on CT TAVR. Discussed finding with patient. No known history of pancreatic abnormality. Patient saw GI over 12 years ago, has not had any recent follow up. Will obtain MR abdomen and patient will follow up with her PCP/GI.  Plan:  - MRA chest in 1 year with aortic registry - Follow up in 1 year. - Continue medication regimen. - Follow up with cardiologist, Dr. Isidro Ortiz. - Blood pressure management reviewed and discussed as it pertains to his/her aortic pathology - MRI pancreas. Follow up with PCP, Dr. Stephen.

## 2025-07-28 NOTE — PHYSICAL EXAM
[Neck Appearance] : the appearance of the neck was normal [] : no respiratory distress [Apical Impulse] : the apical impulse was normal [Heart Rate And Rhythm] : heart rate was normal and rhythm regular [Heart Sounds] : normal S1 and S2 [Abdomen Soft] : soft [Oriented To Time, Place, And Person] : oriented to person, place, and time [Affect] : the affect was normal [Mood] : the mood was normal [FreeTextEntry1] : Ambulates with a cane

## 2025-07-28 NOTE — CONSULT LETTER
[Dear  ___] : Dear  [unfilled], [Courtesy Letter:] : I had the pleasure of seeing your patient, [unfilled], in my office today. [Please see my note below.] : Please see my note below. [Consult Closing:] : Thank you very much for allowing me to participate in the care of this patient.  If you have any questions, please do not hesitate to contact me. [Sincerely,] : Sincerely, [FreeTextEntry2] : Dr. Isidro Ortiz [FreeTextEntry3] : Collins Ernst MD Attending Surgeon  [facility]   Cardiovascular & Thoracic Surgery [facility]

## 2025-07-28 NOTE — REVIEW OF SYSTEMS
[Fever] : no fever [Chills] : no chills [Chest Pain] : no chest pain [Palpitations] : no palpitations [Shortness Of Breath] : no shortness of breath [FreeTextEntry4] : Throat congestion, sometimes wheezing if she forces it

## 2025-07-28 NOTE — END OF VISIT
[FreeTextEntry3] : I, JAN Martinez, personally performed the evaluation and management (E/M) services for this established patient who presents today with an existing condition(s). That E/M includes conducting the examination, assessing all new/exacerbated conditions, and establishing a new plan of care. Today, CHARLI Nuñez, was here to observe my evaluation and management services for this condition to be followed going forward.

## 2025-07-28 NOTE — CONSULT LETTER
[Dear  ___] : Dear  [unfilled], [Courtesy Letter:] : I had the pleasure of seeing your patient, [unfilled], in my office today. [Please see my note below.] : Please see my note below. [Consult Closing:] : Thank you very much for allowing me to participate in the care of this patient.  If you have any questions, please do not hesitate to contact me. [Sincerely,] : Sincerely, [FreeTextEntry2] : Dr. Isidro Ortiz [FreeTextEntry3] : Collins Ernst MD Attending Surgeon  [facility]   Cardiovascular & Thoracic Surgery [facility]

## 2025-07-28 NOTE — DATA REVIEWED
[FreeTextEntry1] :  7/7/25 TAVR CT revealed TAVR measurements, as above. Prior aortic valve replacement. 4.9 cm mid ascending aortic aneurysm. Ecstatic 2.4 cm infrarenal aorta. No dissection. Stable 6 mm left lower lobe nodule since March 2025, on a background of mild emphysema; this can be followed with a chest CT in 12 months to determine continued stability. Nonspecific prominent soft tissue in the region of pancreatic head (11:337), can be further evaluated with MRI.  1/22/25 TTE revealed 1. The left ventricular cavity is normal in size. Left ventricular wall thickness is normal. Septal motion is abnormal consistent with previous cardiac surgery. Left ventricular systolic function is mildly decreased with an ejection fraction visually estimated at 60 to 65%. There are regional wall motion abnormalities present. Hypokinesis of the apex. Overall left ventricular systolic function is preserved.  2. Normal right ventricular cavity size and normal right ventricular systolic function.  3. Structurally normal mitral valve with normal leaflet excursion. There is calcification of the mitral valve annulus. There is trace mitral regurgitation.  4. A bioprosthetic valve replacement is present in the aortic position. The prosthetic valve is well seated with normal function. The peak transaortic velocity is 1.81 m/s, peak transaortic gradient is 13.2 mmHg and mean transaortic gradient is 6.3 mmHg with an LVOT/aortic valve VTI ratio of 0.75. There is no intravalvular regurgitation.  5. No prior echocardiogram is available for comparison.  3/19/25 CTA Chest revealed 52 mm dilation of the ascending thoracic aorta.

## 2025-07-28 NOTE — HISTORY OF PRESENT ILLNESS
[FreeTextEntry1] : Ms. RÍOS is a 65 year old female current smoker ( 1PPD/ 40 yrs)  with  past medical history of  a bicuspid aortic PD/ valve with severe aortic stenosis, ascending aortic aneurysm, hyperlipidemia, a recent GI bleed, nonobstructive coronary disease , s/p AVR(B) in 2013 with Dr. Collins Ernst , LBBB, HTN, GIB,TIA (1961), Right AL (2017 Dr. Ferrari), L2-S1 laminectomy/fusion (2018 Dr. Don) and Thoracic aortic aneurysm for past 10 years. She is here for follow up visit with recent imaging.   Today she presents and reports that she is doing well in regard to cardiac issues. patient denies any chest pain, shortness of breath, palpitations. Previously had dizziness but currently, patient reports very minimal to no instances of dizziness which she attributes to lower BP. Patient has ongoing hip issues from fractured left femur Jan 2025. Waiting for full hip replacement but pt has to decide when to proceed. Patient has some throat congestion/mucous related to allergies.

## 2025-07-30 NOTE — ADDENDUM
[FreeTextEntry1] : I, Pino Branch, documented this note as a scribe on behalf of Bubba Don MD on 07/30/2025.

## 2025-07-30 NOTE — PHYSICAL EXAM
[Antalgic] : antalgic [Cane] : ambulates with cane [Ataxic] : not ataxic [de-identified] : Decreased range of motion of the lumbar spine.  She does have some limitations in hip flexion and knee extension due to recent hip surgery. [de-identified] : AP lateral lumbar x-rays demonstrates instrumented L4-S1 fusion with improved posterior lateral arthrodesis. There may be some trace haloing of the S1 screws. No gross change from prior x-rays.  AP lateral cervical x-rays reveals multilevel spondylosis  Cervical spine MRI reveals multilevel foraminal stenosis.  No high-grade central stenosis.  No spinal cord compression or spinal cord signal abnormality.  Review of her lumbar spine reveals adequate decompression at the surgical levels.  She has adjacent segment degeneration with some increased right greater than left foraminal stenosis

## 2025-07-30 NOTE — HISTORY OF PRESENT ILLNESS
[de-identified] : Ms. Ohara presents to the office s/p L4-S1 fusion on 9/27/18.  She fell in January and had left hip surgery.  She has been having low back pain.  She has been doing PT since last visit without improvement. She states that hardware in her hip has shifted and Bertha Arreola is recommended hip replacement.  Requesting diclofenac gel, and cyclobenzaprine.

## 2025-07-30 NOTE — END OF VISIT
[FreeTextEntry3] : All medical record entries made by the Scribe were at my, Bubba Don MD, direction and personally dictated by me on 07/30/2025. I have reviewed the chart and agree that the record accurately reflects my personal performance of the history, physical exam, assessment and plan. I have also personally directed, reviewed, and agreed with the chart. [Time Spent: ___ minutes] : I have spent [unfilled] minutes of time on the encounter which excludes teaching and separately reported services.

## 2025-07-30 NOTE — DISCUSSION/SUMMARY
[de-identified] : We discussed further treatment options. .She is currently dealing with hip issues. She reportedly has a nonunion and will be following up to address this first before addressing her spinal issues. She will continue medications with pain management. Follow up in 6 months.